# Patient Record
Sex: MALE | Race: WHITE | NOT HISPANIC OR LATINO | Employment: STUDENT | URBAN - NONMETROPOLITAN AREA
[De-identification: names, ages, dates, MRNs, and addresses within clinical notes are randomized per-mention and may not be internally consistent; named-entity substitution may affect disease eponyms.]

---
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
## 2025-05-26 ENCOUNTER — HOSPITAL ENCOUNTER (INPATIENT)
Facility: HOSPITAL | Age: 20
LOS: 1 days | Discharge: HOME | End: 2025-05-27
Attending: STUDENT IN AN ORGANIZED HEALTH CARE EDUCATION/TRAINING PROGRAM | Admitting: STUDENT IN AN ORGANIZED HEALTH CARE EDUCATION/TRAINING PROGRAM

## 2025-05-26 ENCOUNTER — HOSPITAL ENCOUNTER (EMERGENCY)
Facility: HOSPITAL | Age: 20
Discharge: PSYCHIATRIC OR SUBSTANCE USE FACILITY | DRG: 882 | End: 2025-05-26
Payer: OTHER GOVERNMENT

## 2025-05-26 VITALS
RESPIRATION RATE: 18 BRPM | HEART RATE: 62 BPM | WEIGHT: 218 LBS | OXYGEN SATURATION: 100 % | TEMPERATURE: 98.4 F | HEIGHT: 74 IN | DIASTOLIC BLOOD PRESSURE: 71 MMHG | SYSTOLIC BLOOD PRESSURE: 119 MMHG | BODY MASS INDEX: 27.98 KG/M2

## 2025-05-26 DIAGNOSIS — R45.851 SUICIDAL IDEATION: Primary | ICD-10-CM

## 2025-05-26 PROBLEM — F32.9 MDD (MAJOR DEPRESSIVE DISORDER): Status: ACTIVE | Noted: 2025-05-26

## 2025-05-26 LAB
ALBUMIN SERPL-MCNC: 4.6 G/DL (ref 3.5–5.2)
ALBUMIN/GLOB SERPL: 1.8 G/DL
ALP SERPL-CCNC: 77 U/L (ref 39–117)
ALT SERPL W P-5'-P-CCNC: 12 U/L (ref 1–41)
AMPHET+METHAMPHET UR QL: NEGATIVE
AMPHETAMINES UR QL: NEGATIVE
ANION GAP SERPL CALCULATED.3IONS-SCNC: 12.2 MMOL/L (ref 5–15)
AST SERPL-CCNC: 23 U/L (ref 1–40)
BARBITURATES UR QL SCN: NEGATIVE
BASOPHILS # BLD AUTO: 0.03 10*3/MM3 (ref 0–0.2)
BASOPHILS NFR BLD AUTO: 0.5 % (ref 0–1.5)
BENZODIAZ UR QL SCN: NEGATIVE
BILIRUB SERPL-MCNC: 0.9 MG/DL (ref 0–1.2)
BILIRUB UR QL STRIP: NEGATIVE
BUN SERPL-MCNC: 13 MG/DL (ref 6–20)
BUN/CREAT SERPL: 15.1 (ref 7–25)
BUPRENORPHINE SERPL-MCNC: NEGATIVE NG/ML
CALCIUM SPEC-SCNC: 9.5 MG/DL (ref 8.6–10.5)
CANNABINOIDS SERPL QL: NEGATIVE
CHLORIDE SERPL-SCNC: 103 MMOL/L (ref 98–107)
CLARITY UR: ABNORMAL
CO2 SERPL-SCNC: 21.8 MMOL/L (ref 22–29)
COCAINE UR QL: NEGATIVE
COLOR UR: YELLOW
CREAT SERPL-MCNC: 0.86 MG/DL (ref 0.76–1.27)
DEPRECATED RDW RBC AUTO: 39.1 FL (ref 37–54)
EGFRCR SERPLBLD CKD-EPI 2021: 127.1 ML/MIN/1.73
EOSINOPHIL # BLD AUTO: 0.05 10*3/MM3 (ref 0–0.4)
EOSINOPHIL NFR BLD AUTO: 0.9 % (ref 0.3–6.2)
ERYTHROCYTE [DISTWIDTH] IN BLOOD BY AUTOMATED COUNT: 12.5 % (ref 12.3–15.4)
ETHANOL BLD-MCNC: <10 MG/DL (ref 0–10)
ETHANOL UR QL: <0.01 %
FENTANYL UR-MCNC: NEGATIVE NG/ML
GLOBULIN UR ELPH-MCNC: 2.6 GM/DL
GLUCOSE SERPL-MCNC: 111 MG/DL (ref 65–99)
GLUCOSE UR STRIP-MCNC: NEGATIVE MG/DL
HAV IGM SERPL QL IA: NORMAL
HBV CORE IGM SERPL QL IA: NORMAL
HBV SURFACE AG SERPL QL IA: NORMAL
HCT VFR BLD AUTO: 45.8 % (ref 37.5–51)
HCV AB SER QL: NORMAL
HGB BLD-MCNC: 15.5 G/DL (ref 13–17.7)
HGB UR QL STRIP.AUTO: NEGATIVE
IMM GRANULOCYTES # BLD AUTO: 0.01 10*3/MM3 (ref 0–0.05)
IMM GRANULOCYTES NFR BLD AUTO: 0.2 % (ref 0–0.5)
KETONES UR QL STRIP: ABNORMAL
LEUKOCYTE ESTERASE UR QL STRIP.AUTO: NEGATIVE
LYMPHOCYTES # BLD AUTO: 1.85 10*3/MM3 (ref 0.7–3.1)
LYMPHOCYTES NFR BLD AUTO: 32.9 % (ref 19.6–45.3)
MAGNESIUM SERPL-MCNC: 2.2 MG/DL (ref 1.7–2.2)
MCH RBC QN AUTO: 29.2 PG (ref 26.6–33)
MCHC RBC AUTO-ENTMCNC: 33.8 G/DL (ref 31.5–35.7)
MCV RBC AUTO: 86.3 FL (ref 79–97)
METHADONE UR QL SCN: NEGATIVE
MONOCYTES # BLD AUTO: 0.35 10*3/MM3 (ref 0.1–0.9)
MONOCYTES NFR BLD AUTO: 6.2 % (ref 5–12)
NEUTROPHILS NFR BLD AUTO: 3.33 10*3/MM3 (ref 1.7–7)
NEUTROPHILS NFR BLD AUTO: 59.3 % (ref 42.7–76)
NITRITE UR QL STRIP: NEGATIVE
NRBC BLD AUTO-RTO: 0 /100 WBC (ref 0–0.2)
OPIATES UR QL: NEGATIVE
OXYCODONE UR QL SCN: NEGATIVE
PCP UR QL SCN: NEGATIVE
PH UR STRIP.AUTO: 6 [PH] (ref 5–8)
PLATELET # BLD AUTO: 220 10*3/MM3 (ref 140–450)
PMV BLD AUTO: 10.9 FL (ref 6–12)
POTASSIUM SERPL-SCNC: 4 MMOL/L (ref 3.5–5.2)
PROT SERPL-MCNC: 7.2 G/DL (ref 6–8.5)
PROT UR QL STRIP: NEGATIVE
RBC # BLD AUTO: 5.31 10*6/MM3 (ref 4.14–5.8)
SODIUM SERPL-SCNC: 137 MMOL/L (ref 136–145)
SP GR UR STRIP: 1.03 (ref 1–1.03)
TRICYCLICS UR QL SCN: NEGATIVE
UROBILINOGEN UR QL STRIP: ABNORMAL
WBC NRBC COR # BLD AUTO: 5.62 10*3/MM3 (ref 3.4–10.8)

## 2025-05-26 PROCEDURE — 93005 ELECTROCARDIOGRAM TRACING: CPT

## 2025-05-26 PROCEDURE — 80053 COMPREHEN METABOLIC PANEL: CPT

## 2025-05-26 PROCEDURE — 80307 DRUG TEST PRSMV CHEM ANLYZR: CPT

## 2025-05-26 PROCEDURE — 82077 ASSAY SPEC XCP UR&BREATH IA: CPT

## 2025-05-26 PROCEDURE — 80074 ACUTE HEPATITIS PANEL: CPT | Performed by: STUDENT IN AN ORGANIZED HEALTH CARE EDUCATION/TRAINING PROGRAM

## 2025-05-26 PROCEDURE — 83735 ASSAY OF MAGNESIUM: CPT

## 2025-05-26 PROCEDURE — 81003 URINALYSIS AUTO W/O SCOPE: CPT

## 2025-05-26 PROCEDURE — 85025 COMPLETE CBC W/AUTO DIFF WBC: CPT

## 2025-05-26 PROCEDURE — 99285 EMERGENCY DEPT VISIT HI MDM: CPT

## 2025-05-26 PROCEDURE — 36415 COLL VENOUS BLD VENIPUNCTURE: CPT

## 2025-05-26 RX ORDER — ONDANSETRON 4 MG/1
4 TABLET, ORALLY DISINTEGRATING ORAL EVERY 6 HOURS PRN
Status: DISCONTINUED | OUTPATIENT
Start: 2025-05-26 | End: 2025-05-27 | Stop reason: HOSPADM

## 2025-05-26 RX ORDER — LOPERAMIDE HYDROCHLORIDE 2 MG/1
2 CAPSULE ORAL
Status: DISCONTINUED | OUTPATIENT
Start: 2025-05-26 | End: 2025-05-27 | Stop reason: HOSPADM

## 2025-05-26 RX ORDER — HYDROXYZINE HYDROCHLORIDE 25 MG/1
50 TABLET, FILM COATED ORAL EVERY 6 HOURS PRN
Status: DISCONTINUED | OUTPATIENT
Start: 2025-05-26 | End: 2025-05-27 | Stop reason: HOSPADM

## 2025-05-26 RX ORDER — ECHINACEA PURPUREA EXTRACT 125 MG
2 TABLET ORAL
Status: DISCONTINUED | OUTPATIENT
Start: 2025-05-26 | End: 2025-05-27 | Stop reason: HOSPADM

## 2025-05-26 RX ORDER — BENZTROPINE MESYLATE 1 MG/1
2 TABLET ORAL ONCE AS NEEDED
Status: DISCONTINUED | OUTPATIENT
Start: 2025-05-26 | End: 2025-05-27 | Stop reason: HOSPADM

## 2025-05-26 RX ORDER — ALUMINA, MAGNESIA, AND SIMETHICONE 2400; 2400; 240 MG/30ML; MG/30ML; MG/30ML
15 SUSPENSION ORAL EVERY 6 HOURS PRN
Status: DISCONTINUED | OUTPATIENT
Start: 2025-05-26 | End: 2025-05-27 | Stop reason: HOSPADM

## 2025-05-26 RX ORDER — IBUPROFEN 400 MG/1
400 TABLET, FILM COATED ORAL EVERY 6 HOURS PRN
Status: DISCONTINUED | OUTPATIENT
Start: 2025-05-26 | End: 2025-05-27 | Stop reason: HOSPADM

## 2025-05-26 RX ORDER — POLYETHYLENE GLYCOL 3350 17 G/17G
17 POWDER, FOR SOLUTION ORAL DAILY PRN
Status: DISCONTINUED | OUTPATIENT
Start: 2025-05-26 | End: 2025-05-27 | Stop reason: HOSPADM

## 2025-05-26 RX ORDER — BENZTROPINE MESYLATE 1 MG/ML
1 INJECTION, SOLUTION INTRAMUSCULAR; INTRAVENOUS ONCE AS NEEDED
Status: DISCONTINUED | OUTPATIENT
Start: 2025-05-26 | End: 2025-05-27 | Stop reason: HOSPADM

## 2025-05-26 RX ORDER — ACETAMINOPHEN 325 MG/1
650 TABLET ORAL EVERY 6 HOURS PRN
Status: DISCONTINUED | OUTPATIENT
Start: 2025-05-26 | End: 2025-05-27 | Stop reason: HOSPADM

## 2025-05-26 RX ORDER — FAMOTIDINE 20 MG/1
20 TABLET, FILM COATED ORAL 2 TIMES DAILY PRN
Status: DISCONTINUED | OUTPATIENT
Start: 2025-05-26 | End: 2025-05-27 | Stop reason: HOSPADM

## 2025-05-26 RX ORDER — TRAZODONE HYDROCHLORIDE 50 MG/1
50 TABLET ORAL NIGHTLY PRN
Status: DISCONTINUED | OUTPATIENT
Start: 2025-05-26 | End: 2025-05-27 | Stop reason: HOSPADM

## 2025-05-26 RX ORDER — BENZONATATE 100 MG/1
100 CAPSULE ORAL 3 TIMES DAILY PRN
Status: DISCONTINUED | OUTPATIENT
Start: 2025-05-26 | End: 2025-05-27 | Stop reason: HOSPADM

## 2025-05-26 NOTE — NURSING NOTE
"Patient presents reporting suicidal ideation. He reports he attempted to hang himself with a garden hose but, his mother came down the steps and he stopped. He denies hi and avh. Patient reports he has been suicidal since joining the  in 2023. He reports this is the last time he is going to be home before getting deployed to [place] and his girlfriend broke up with him yesterday. He states, \"Im just nervous and all I do is my job, that's all my life revolves around, nothing else.\" He reports poor sleep. He rates anxiety 6/10 and depression 8/10.  "

## 2025-05-26 NOTE — PLAN OF CARE
Goal Outcome Evaluation:  Plan of Care Reviewed With: patient  Patient Agreement with Plan of Care: agrees           New admission.  Care plan initiated.

## 2025-05-26 NOTE — NURSING NOTE
"Presented to ED reporting SI with thoughts to hang himself.  Reports that he was going to do it today with a garden hose, but stopped when he heard his mother coming down the stairs.  Reported to intake RN that he has been suicidal since joining the [employer] in 2023.  Denies any previous attempts.  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  States, \"life just keeps kicking me in the behind and I can't deal with it anymore.\"  No prior inpatient admissions.  No current medications.  Plans to return to mother's home upon discharge.  Report given to SANDRA Beebe and SANDRA Antoine.   "

## 2025-05-27 VITALS
DIASTOLIC BLOOD PRESSURE: 70 MMHG | OXYGEN SATURATION: 100 % | WEIGHT: 215.8 LBS | TEMPERATURE: 97.9 F | BODY MASS INDEX: 27.7 KG/M2 | SYSTOLIC BLOOD PRESSURE: 118 MMHG | RESPIRATION RATE: 16 BRPM | HEIGHT: 74 IN | HEART RATE: 53 BPM

## 2025-05-27 PROBLEM — F43.23 ADJUSTMENT DISORDER WITH MIXED ANXIETY AND DEPRESSED MOOD: Status: ACTIVE | Noted: 2025-05-26

## 2025-05-27 LAB
QT INTERVAL: 406 MS
QTC INTERVAL: 388 MS

## 2025-05-27 PROCEDURE — 99235 HOSP IP/OBS SAME DATE MOD 70: CPT | Performed by: PSYCHIATRY & NEUROLOGY

## 2025-05-27 NOTE — DISCHARGE SUMMARY
":  2005  MRN:  [id number]  Visit Number:  [id number]      Date of Admission:2025   Date of Discharge:  2025    Discharge Diagnosis:  Principal Problem:    Adjustment disorder with mixed anxiety and depressed mood        Admission Diagnosis:  MDD (major depressive disorder) [F32.9]     CINDY Macias is a 20 y.o. male who was admitted on 2025 with complaints of feeling really bad and had suicidal ideations with thoughts of hanging himself.   For details please see H&P dated 25.     Hospital Course  Patient is a 20 y.o. male presented with suicidal ideations. He reports he felt upset and overwhelmed after his GF broke up with him as she didn't want a long-distance relationship as the patient has been in the  for the last year and half. The patient was admitted to the inpatient unit for safety, further evaluation and treatment. The patient reported feeling better after being in the hospital overnight and denied any active suicidal ideations. He struggled with  fear of failure. He was encouraged to accept his limitations and that there was nothing wrong with making a mistake as long was one is willing to learn from it. He reported that he planned to return to his duty as it gave  him a sense of purpose and structure, planned to complete his tenure and then return home and start college. He was also more accepting of the end of relationship with his GF. The patient made improvement in his mood and expressed feeling more positive and hopeful about future. Sleep and appetite were improved.  The day of discharge the patient was calm, cooperative and pleasant. Mood was reported to be good, and denied SI/HI/AVH. The patient was not started on any medications. The patient agreed to outpatient counseling and [facility].     Mental Status Exam upon discharge:   Mood \"better\"   Affect-congruent, appropriate, stable  Thought Content-goal directed, no delusional " material present  Thought process-linear, organized.  Suicidality: No SI  Homicidality: No HI  Perception: No AH/VH    Procedures Performed         Consults:   Consults       No orders found for last 30 day(s).            Pertinent Test Results:   Admission on 05/26/2025   Component Date Value Ref Range Status    Hepatitis B Surface Ag 05/26/2025 Non-Reactive  Non-Reactive Final    Hep A IgM 05/26/2025 Non-Reactive  Non-Reactive Final    Hep B C IgM 05/26/2025 Non-Reactive  Non-Reactive Final    Hepatitis C Ab 05/26/2025 Non-Reactive  Non-Reactive Final   Admission on 05/26/2025, Discharged on 05/26/2025   Component Date Value Ref Range Status    Glucose 05/26/2025 111 (H)  65 - 99 mg/dL Final    BUN 05/26/2025 13  6 - 20 mg/dL Final    Creatinine 05/26/2025 0.86  0.76 - 1.27 mg/dL Final    Sodium 05/26/2025 137  136 - 145 mmol/L Final    Potassium 05/26/2025 4.0  3.5 - 5.2 mmol/L Final    Chloride 05/26/2025 103  98 - 107 mmol/L Final    CO2 05/26/2025 21.8 (L)  22.0 - 29.0 mmol/L Final    Calcium 05/26/2025 9.5  8.6 - 10.5 mg/dL Final    Total Protein 05/26/2025 7.2  6.0 - 8.5 g/dL Final    Albumin 05/26/2025 4.6  3.5 - 5.2 g/dL Final    ALT (SGPT) 05/26/2025 12  1 - 41 U/L Final    AST (SGOT) 05/26/2025 23  1 - 40 U/L Final    Alkaline Phosphatase 05/26/2025 77  39 - 117 U/L Final    Total Bilirubin 05/26/2025 0.9  0.0 - 1.2 mg/dL Final    Globulin 05/26/2025 2.6  gm/dL Final    A/G Ratio 05/26/2025 1.8  g/dL Final    BUN/Creatinine Ratio 05/26/2025 15.1  7.0 - 25.0 Final    Anion Gap 05/26/2025 12.2  5.0 - 15.0 mmol/L Final    eGFR 05/26/2025 127.1  >60.0 mL/min/1.73 Final    Color, UA 05/26/2025 Yellow  Yellow, Straw Final    Appearance, UA 05/26/2025 Cloudy (A)  Clear Final    pH, UA 05/26/2025 6.0  5.0 - 8.0 Final    Specific Gravity, UA 05/26/2025 1.028  1.005 - 1.030 Final    Glucose, UA 05/26/2025 Negative  Negative Final    Ketones, UA 05/26/2025 Trace (A)  Negative Final    Bilirubin, UA 05/26/2025  Negative  Negative Final    Blood, UA 05/26/2025 Negative  Negative Final    Protein, UA 05/26/2025 Negative  Negative Final    Leuk Esterase, UA 05/26/2025 Negative  Negative Final    Nitrite, UA 05/26/2025 Negative  Negative Final    Urobilinogen, UA 05/26/2025 1.0 E.U./dL  0.2 - 1.0 E.U./dL Final    THC, Screen, Urine 05/26/2025 Negative  Negative Final    Phencyclidine (PCP), Urine 05/26/2025 Negative  Negative Final    Cocaine Screen, Urine 05/26/2025 Negative  Negative Final    Methamphetamine, Ur 05/26/2025 Negative  Negative Final    Opiate Screen 05/26/2025 Negative  Negative Final    Amphetamine Screen, Urine 05/26/2025 Negative  Negative Final    Benzodiazepine Screen, Urine 05/26/2025 Negative  Negative Final    Tricyclic Antidepressants Screen 05/26/2025 Negative  Negative Final    Methadone Screen, Urine 05/26/2025 Negative  Negative Final    Barbiturates Screen, Urine 05/26/2025 Negative  Negative Final    Oxycodone Screen, Urine 05/26/2025 Negative  Negative Final    Buprenorphine, Screen, Urine 05/26/2025 Negative  Negative Final    Magnesium 05/26/2025 2.2  1.7 - 2.2 mg/dL Final    Ethanol 05/26/2025 <10  0 - 10 mg/dL Final    Ethanol % 05/26/2025 <0.010  % Final    WBC 05/26/2025 5.62  3.40 - 10.80 10*3/mm3 Final    RBC 05/26/2025 5.31  4.14 - 5.80 10*6/mm3 Final    Hemoglobin 05/26/2025 15.5  13.0 - 17.7 g/dL Final    Hematocrit 05/26/2025 45.8  37.5 - 51.0 % Final    MCV 05/26/2025 86.3  79.0 - 97.0 fL Final    MCH 05/26/2025 29.2  26.6 - 33.0 pg Final    MCHC 05/26/2025 33.8  31.5 - 35.7 g/dL Final    RDW 05/26/2025 12.5  12.3 - 15.4 % Final    RDW-SD 05/26/2025 39.1  37.0 - 54.0 fl Final    MPV 05/26/2025 10.9  6.0 - 12.0 fL Final    Platelets 05/26/2025 220  140 - 450 10*3/mm3 Final    Neutrophil % 05/26/2025 59.3  42.7 - 76.0 % Final    Lymphocyte % 05/26/2025 32.9  19.6 - 45.3 % Final    Monocyte % 05/26/2025 6.2  5.0 - 12.0 % Final    Eosinophil % 05/26/2025 0.9  0.3 - 6.2 % Final     Basophil % 05/26/2025 0.5  0.0 - 1.5 % Final    Immature Grans % 05/26/2025 0.2  0.0 - 0.5 % Final    Neutrophils, Absolute 05/26/2025 3.33  1.70 - 7.00 10*3/mm3 Final    Lymphocytes, Absolute 05/26/2025 1.85  0.70 - 3.10 10*3/mm3 Final    Monocytes, Absolute 05/26/2025 0.35  0.10 - 0.90 10*3/mm3 Final    Eosinophils, Absolute 05/26/2025 0.05  0.00 - 0.40 10*3/mm3 Final    Basophils, Absolute 05/26/2025 0.03  0.00 - 0.20 10*3/mm3 Final    Immature Grans, Absolute 05/26/2025 0.01  0.00 - 0.05 10*3/mm3 Final    nRBC 05/26/2025 0.0  0.0 - 0.2 /100 WBC Final    QT Interval 05/26/2025 406  ms Final    QTC Interval 05/26/2025 388  ms Final    Fentanyl, Urine 05/26/2025 Negative  Negative Final        Condition on Discharge:  improved    Vital Signs  Temp:  [97.4 °F (36.3 °C)-98.4 °F (36.9 °C)] 97.9 °F (36.6 °C)  Heart Rate:  [52-62] 53  Resp:  [16-18] 16  BP: (112-145)/(55-71) 118/70      Discharge Disposition:  Home or Self Care    Discharge Medications:     Discharge Medications      Patient Not Prescribed Medications Upon Discharge         Discharge Diet:    Diet Instructions    REGULAR           Activity at Discharge:    Activity Instructions    AS TOLERATED           Follow-up Appointments  Future Appointments   Date Time Provider Department Center   6/3/2025 12:30 PM [facility]         Time: I spent  > 30  minutes on this discharge activity which included: face-to-face encounter with the patient, reviewing the data in the system, coordination of the care with the nursing staff as well as consultants, documentation, and entering orders.        Clinician:   Keon Us MD  05/27/25  15:37 EDT

## 2025-05-27 NOTE — PLAN OF CARE
Goal Outcome Evaluation:  Plan of Care Reviewed With: patient  Plan of Care Reviewed With: patient  Patient Agreement with Plan of Care: agrees     Progress: improving  Outcome Evaluation: Pt reports depression 7/10. Pt denies anxiety, SI/HI/AVH. Pt reports poor sleep and good appetite.

## 2025-05-27 NOTE — PLAN OF CARE
Goal Outcome Evaluation:  Plan of Care Reviewed With: patient  Plan of Care Reviewed With: patient  Patient Agreement with Plan of Care: agrees     Progress: improving  Outcome Evaluation: Pt has been calm and cooperative this shift. Pt rates anxiety 2/10 and depression 6/10. Pt denies SI/HI/AVH. Pt reports poor sleep  due to being up and down a lot and good appetite. Pt has had  no complaints this shift.

## 2025-05-27 NOTE — PLAN OF CARE
Goal Outcome Evaluation:           Progress: improving  Outcome Evaluation: Therapist met with patient to review care plan, social history, aftercare recommendation, and disposition plan; patient agreeable.           Problem: Adult Behavioral Health Plan of Care  Goal: Plan of Care Review  Outcome: Progressing  Flowsheets  Taken 5/27/2025 1432 by Adrian Trivedi  Progress: improving  Outcome Evaluation:   Therapist met with patient to review care plan, social history, aftercare recommendation, and disposition plan   patient agreeable.  Taken 5/27/2025 1337 by Sade Dove RN  Patient Agreement with Plan of Care: agrees  Plan of Care Reviewed With: patient  Goal: Patient-Specific Goal (Individualization)  Outcome: Progressing  Flowsheets  Taken 5/27/2025 1432 by Adrian Trivedi  Patient/Family-Specific Goals (Include Timeframe): Identify 2-3 coping skills, complete aftercare plan, complete safety plan, and deny SI/HI within 1-7 days.  Individualized Care Needs: Therapist to offer 1-4 therapy sessions, aftercare planning, safety planning, daily groups, and brief CBT/MI interventions.  Taken 5/27/2025 1427 by Adrian Trivedi  Patient Personal Strengths:   resilient   resourceful   self-reliant   motivated for treatment   expressive of needs   expressive of emotions  Patient Vulnerabilities:   lacks insight into illness   poor impulse control   family/relationship conflict  Taken 5/26/2025 1840 by Jessica Veloz RN  Anxieties, Fears or Concerns: None verbalized  Goal: Optimized Coping Skills in Response to Life Stressors  Outcome: Progressing  Intervention: Promote Effective Coping Strategies  Flowsheets (Taken 5/27/2025 1432)  Supportive Measures:   active listening utilized   positive reinforcement provided   decision-making supported   counseling provided   mindfulness techniques promoted   self-reflection promoted   self-responsibility promoted   verbalization of feelings encouraged  Goal:  Develops/Participates in Therapeutic [place] to Support Successful Transition  Outcome: Progressing  Intervention: Foster Therapeutic [place]  Flowsheets (Taken 5/27/2025 1432)  Trust Relationship/Rapport:   care explained   choices provided   reassurance provided   thoughts/feelings acknowledged   emotional support provided   empathic listening provided   questions answered   questions encouraged  Intervention: Mutually Develop Transition Plan  Flowsheets  Taken 5/27/2025 1432 by Adrian Trivedi  Outpatient/Agency/Support Group Needs:   outpatient counseling   outpatient medication management  Discharge Coordination/Progress:   Therapist met with patient to complete discharge needs assessment   patient considering outpatient services.  Transition Support:   crisis management plan promoted   crisis management plan verbalized   follow-up care discussed  Anticipated Discharge Disposition: home with family  Concerns to be Addressed:   medication   mental health  Readmission Within the Last 30 Days: no previous admission in last 30 days  Taken 5/26/2025 1840 by Jessica Veloz RN  Transportation Anticipated: family or friend will provide  Patient/Family Anticipated Services at Transition:   mental health services   outpatient care  Patient/Family Anticipates Transition to: home with family     DATA:  Therapist met individually with Patient this date for initial evaluation.  Introduced self as Therapist and the role of a positive therapeutic relationship; Patient agreeable.      Therapist encouraged Patient to speak openly and honestly about any issues or stressors during treatment stay. Therapist explained how open communication is significant to providing most effective care.      Therapist completed psychosocial assessment, integrated summary, reviewed care plans, disposition planning and discussed hospitalization expectations and treatment goals this date.     Therapist provided education regarding different  levels of care and is recommending outpatient therapy and medication management for most appropriate aftercare. Patient agreeable. Patient unsure his best option on this as he is on active duty with the [employer].     Therapist is recommending family involvement prior to discharge and it's importance. Patient agreeable and signed consent for mother, eDsirae.     Therapist met with patient's mother during visitation. Patient's mother Desirae, states that the patient's recent break up seemed to provide a major stressor for him and lead to his SI. Mother reports that the patient will be able to return home with her upon discharge and she will provide him transportation.     Safety planning completed with patient's mother. Recommendation to limit access to firearms, sharp objects, medications, and objects he might attempt to hang himself with with.     [place] safety plan completed with patient.     CLINICAL MANUVERING/INTERVENTIONS:  Assisted Patient in processing session content; acknowledged and normalized Patient’s thoughts, feelings, and concerns by utilizing a person-centered approach in efforts to build appropriate rapport and a positive therapeutic relationship with open and honest communication. Allowed Patient to ventilate regarding current stressors and triggers for negative emotions and thoughts in a safe nonjudgmental environment with unconditional positive regard, active listening skills, and empathy.      ASSESSMENT: Patient is a 20 year old male who reported to the ED with SI. Patient is currently living with his mother while on leave from the , which ends in early June. Patient has no prior admissions to this facility.     Patient seen 1:1 for follow up. Patient reports that he is feeling okay today. Patient states that he has been feeling upset since his girlfriend broke up with him. Patient reports that she had been a constant for him while he has been in the  and they had been trying  to make this work long-distance. Patient states that this combined with the stress of being in the  and his upcoming deployment to [place] has him feeling very stressed. Patient reports that he is no longer feeling suicidal and recognizes he may have overreacted to the end of his relationship. Patient denies SI/HI/AVH.     PLAN: Patient will receive 24/7 nursing monitoring and daily psychiatrist evaluation by a multidisciplinary team.    Patient will continue stabilization at this time.     Public assistance with transportation will not be needed. Family member will provide.

## 2025-05-27 NOTE — PLAN OF CARE
Goal Outcome Evaluation:  Plan of Care Reviewed With: patient  Plan of Care Reviewed With: patient  Patient Agreement with Plan of Care: agrees     PT DISCHARGING TODAY.

## 2025-05-27 NOTE — H&P
INITIAL PSYCHIATRIC HISTORY & PHYSICAL    Patient Identification:  Name:  Teodora Macias  Age:  20 y.o.  Sex:  male  :  2005  MRN:  [id number]   Visit Number:  [id number]  Primary Care Physician:  Marie Rodriguez APRN    SUBJECTIVE    CC/Focus of Exam: Suicidal ideations    HPI: Teodora Macias is a 20 y.o. male who was admitted on 2025 with complaints of feeling really bad and had suicidal ideations with thoughts of hanging himself.  He reported that he was going to do it the day he came to the hospital and hang himself with a garden hose but stopped when he heard his mother came down the stairs.  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  He is home for about 2 weeks before he goes back and then gets deployed overseas. He states he doesn't really want to kill himself and is willing to go back and be deployed.     PAST PSYCHIATRIC HX: The patient has been treated in the outpatient clinic for adjustment disorder and parent-child relational problems    SUBSTANCE USE HX: None reported    SOCIAL HX:   Social History     Socioeconomic History    Marital status: Single    Number of children: 0    Highest education level: High school graduate   Tobacco Use    Smoking status: Never    Smokeless tobacco: Never   Vaping Use    Vaping status: Never Used   Substance and Sexual Activity    Alcohol use: Not Currently    Drug use: Not Currently    Sexual activity: Yes     Partners: Female         Past Medical History:   Diagnosis Date    Anxiety     Depression           Past Surgical History:   Procedure Laterality Date    NO PAST SURGERIES         Family History   Problem Relation Age of Onset    Anxiety disorder Mother          No medications prior to admission.         ALLERGIES:  Patient has no known allergies.    Temp:  [97.4 °F (36.3  °C)-98.4 °F (36.9 °C)] 97.9 °F (36.6 °C)  Heart Rate:  [52-62] 53  Resp:  [16-18] 16  BP: (112-145)/(55-71) 118/70    REVIEW OF SYSTEMS:  Review of Systems   Constitutional: Negative.    HENT: Negative.     Eyes: Negative.    Respiratory: Negative.     Cardiovascular: Negative.    Gastrointestinal: Negative.    Endocrine: Negative.    Genitourinary: Negative.    Musculoskeletal: Negative.    Skin: Negative.    Allergic/Immunologic: Negative.    Neurological: Negative.    Hematological: Negative.    Psychiatric/Behavioral:  Positive for dysphoric mood. The patient is nervous/anxious.         OBJECTIVE    PHYSICAL EXAM:  Physical Exam  Constitutional:  Appears well-developed and well-nourished.   HENT:   Head: Normocephalic and atraumatic.   Right Ear: External ear normal.   Left Ear: External ear normal.   Mouth/Throat: Oropharynx is clear and moist.   Eyes: Pupils are equal, round, and reactive to light. Conjunctivae and EOM are normal.   Neck: Normal range of motion. Neck supple.   Cardiovascular: Normal rate, regular rhythm and normal heart sounds.    Respiratory: Effort normal and breath sounds normal. No respiratory distress. No wheezes.   GI: Soft. Bowel sounds are normal.No distension. There is no tenderness.   Musculoskeletal: Normal range of motion. No edema or deformity.   Neurological:  Cranial Nerves: I. No anosmia. II: No visual disturbance. III, IV VI: EOMI, PERRLA. V: Corneal reflext intact, no abnormal sensations. VII: No facial palsy, or altered sensation. VIII: Hearing intact, balance intact. IX: Intact ah reflex. X: Normal phonation, swallowing. XI: Normal shrug and head movement. XII: Intact tongue movements  Coordination normal. No lateralizing signs.  Skin: Skin is warm and dry. No rash noted. No erythema.     MENTAL STATUS EXAM:   Hygiene:   fair  Cooperation:  Cooperative  Eye Contact:  Good  Psychomotor Behavior:  Appropriate  Affect:  Appropriate  Hopelessness: Denies  Speech:   Normal  Thought Progress: Goal directed  Thought Content:  Normal  Suicidal:  None  Homicidal:  None  Hallucinations:  None  Delusion:  None  Memory:  Intact  Orientation:  Person, Place, Time, and Situation  Reliability:  fair  Insight:  Fair  Judgement:  Fair  Impulse Control:  Fair    Imaging Results (Last 24 Hours)       ** No results found for the last 24 hours. **             ECG/EMG Results (most recent)       None             Lab Results   Component Value Date    GLUCOSE 111 (H) 05/26/2025    BUN 13 05/26/2025    CREATININE 0.86 05/26/2025    EGFRIFNONA  06/20/2020      Comment:      Unable to calculate GFR, patient age <18.    EGFRIFAFRI  06/20/2020      Comment:      Unable to calculate GFR, patient age <18.    BCR 15.1 05/26/2025    CO2 21.8 (L) 05/26/2025    CALCIUM 9.5 05/26/2025    ALBUMIN 4.6 05/26/2025    AST 23 05/26/2025    ALT 12 05/26/2025       Lab Results   Component Value Date    WBC 5.62 05/26/2025    HGB 15.5 05/26/2025    HCT 45.8 05/26/2025    MCV 86.3 05/26/2025     05/26/2025       Last Urine Toxicity          Latest Ref Rng & Units 5/26/2025   LAST URINE TOXICITY RESULTS   Amphetamine, Urine Qual Negative Negative    Barbiturates Screen, Urine Negative Negative    Benzodiazepine Screen, Urine Negative Negative    Buprenorphine, Screen, Urine Negative Negative    Cocaine Screen, Urine Negative Negative    Fentanyl, Urine Negative Negative    Methadone Screen , Urine Negative Negative    Methamphetamine, Ur Negative Negative        Brief Urine Lab Results  (Last result in the past 365 days)        Color   Clarity   Blood   Leuk Est   Nitrite   Protein   CREAT   Urine HCG        05/26/25 1705 Yellow   Cloudy   Negative   Negative   Negative   Negative                   DATA  Labs reviewed.  Glucose 111.  CBC is unremarkable.  Hepatitis screen is negative.  Urinalysis shows cloudy appearance, trace ketones.  Urine drug screen is negative  EKG reviewed.  QTc interval is 388  ms. ESPINO reviewed.   Record reviewed. The patient has been in outpatient treatment at the [facility] as an adolescent.     Strengths: Motivated for treatment    Weaknesses:Poor coping skills    Code status:  Full  Discussed code status with patient.    ASSESSMENT & PLAN:    Hospital bed: No      Adjustment disorder with mixed anxiety and depressed mood  -The patient states he got upset and overwhelmed but has had time to reflect and realizes that he has to accept his limitations and to keep trying to do the best he can. He feels safe and wants to go home and then return to his duty and be deployed overseas. He agreed to see a therapist outpatient in the meantime to work on his coping skills.

## 2025-05-29 NOTE — ED PROVIDER NOTES
Subjective   History of Present Illness  Patient is a 20 year old male with PMH significant for anxiety and depression. He presents to the ED for psych eval. He states he is suicidal. He reports this is due to his job and being away from home. Denies HI/ AVH        Review of Systems   Constitutional: Negative.  Negative for fever.   HENT: Negative.     Respiratory: Negative.     Cardiovascular: Negative.  Negative for chest pain.   Gastrointestinal: Negative.  Negative for abdominal pain.   Endocrine: Negative.    Genitourinary: Negative.  Negative for dysuria.   Skin: Negative.    Neurological: Negative.    Psychiatric/Behavioral:  Positive for sleep disturbance and suicidal ideas. The patient is nervous/anxious.    All other systems reviewed and are negative.      Past Medical History:   Diagnosis Date    Anxiety     Depression        No Known Allergies    Past Surgical History:   Procedure Laterality Date    NO PAST SURGERIES         Family History   Problem Relation Age of Onset    Anxiety disorder Mother        Social History     Socioeconomic History    Marital status: Single    Number of children: 0    Highest education level: High school graduate   Tobacco Use    Smoking status: Never    Smokeless tobacco: Never   Vaping Use    Vaping status: Never Used   Substance and Sexual Activity    Alcohol use: Not Currently    Drug use: Not Currently    Sexual activity: Yes     Partners: Female           Objective   Physical Exam  Vitals and nursing note reviewed.   Constitutional:       General: He is not in acute distress.     Appearance: He is well-developed. He is not diaphoretic.   HENT:      Head: Normocephalic and atraumatic.      Right Ear: External ear normal.      Left Ear: External ear normal.      Nose: Nose normal.   Eyes:      Conjunctiva/sclera: Conjunctivae normal.      Pupils: Pupils are equal, round, and reactive to light.   Neck:      Vascular: No JVD.      Trachea: No tracheal deviation.    Cardiovascular:      Rate and Rhythm: Normal rate and regular rhythm.      Heart sounds: Normal heart sounds. No murmur heard.  Pulmonary:      Effort: Pulmonary effort is normal. No respiratory distress.      Breath sounds: Normal breath sounds. No wheezing.   Abdominal:      General: Bowel sounds are normal.      Palpations: Abdomen is soft.      Tenderness: There is no abdominal tenderness.   Musculoskeletal:         General: No deformity. Normal range of motion.      Cervical back: Normal range of motion and neck supple.   Skin:     General: Skin is warm and dry.      Coloration: Skin is not pale.      Findings: No erythema or rash.   Neurological:      Mental Status: He is alert and oriented to person, place, and time.      Cranial Nerves: No cranial nerve deficit.   Psychiatric:         Behavior: Behavior normal.         Thought Content: Thought content normal.         Procedures           ED Course                                                       Medical Decision Making  Problems Addressed:  Suicidal ideation: complicated acute illness or injury    Amount and/or Complexity of Data Reviewed  Labs: ordered.  ECG/medicine tests: ordered.        Final diagnoses:   Suicidal ideation       ED Disposition  ED Disposition       ED Disposition   DC/Transfer to Behavioral Health Condition   Stable    Comment   --               No follow-up provider specified.       Medication List      No changes were made to your prescriptions during this visit.            Gloria Hagen, FAUSTO  05/28/25 2307       Gloria Hagen APRN  05/28/25 2316